# Patient Record
Sex: MALE | Race: BLACK OR AFRICAN AMERICAN | Employment: FULL TIME | URBAN - METROPOLITAN AREA
[De-identification: names, ages, dates, MRNs, and addresses within clinical notes are randomized per-mention and may not be internally consistent; named-entity substitution may affect disease eponyms.]

---

## 2019-08-14 ENCOUNTER — OFFICE VISIT (OUTPATIENT)
Dept: FAMILY MEDICINE | Facility: CLINIC | Age: 24
End: 2019-08-14
Payer: OTHER GOVERNMENT

## 2019-08-14 VITALS
BODY MASS INDEX: 28.62 KG/M2 | DIASTOLIC BLOOD PRESSURE: 71 MMHG | HEIGHT: 74 IN | OXYGEN SATURATION: 98 % | SYSTOLIC BLOOD PRESSURE: 118 MMHG | WEIGHT: 223 LBS | TEMPERATURE: 97.1 F | HEART RATE: 79 BPM

## 2019-08-14 DIAGNOSIS — M25.561 RIGHT KNEE PAIN, UNSPECIFIED CHRONICITY: ICD-10-CM

## 2019-08-14 DIAGNOSIS — S83.511D RUPTURE OF ANTERIOR CRUCIATE LIGAMENT OF RIGHT KNEE, SUBSEQUENT ENCOUNTER: Primary | ICD-10-CM

## 2019-08-14 PROCEDURE — 99203 OFFICE O/P NEW LOW 30 MIN: CPT | Performed by: FAMILY MEDICINE

## 2019-08-14 SDOH — HEALTH STABILITY: MENTAL HEALTH: HOW OFTEN DO YOU HAVE A DRINK CONTAINING ALCOHOL?: NEVER

## 2019-08-14 ASSESSMENT — MIFFLIN-ST. JEOR: SCORE: 2061.27

## 2019-08-14 NOTE — PROGRESS NOTES
"Subjective     Paulo Roldan is a 24 year old male who presents to clinic today for the following health issues:    HPI   Joint Pain    Onset: April 4th     Description:   Location: right knee  Character: Sharp, Dull ache and Burning    Intensity: moderate    Progression of Symptoms: same    Accompanying Signs & Symptoms:  Other symptoms: weakness of leg  and swelling    History:   Previous similar pain: no       Precipitating factors:   Trauma or overuse: YES    Alleviating factors:  Improved by: heat, ice and support wrap    Therapies Tried and outcome: support wrap, heat, ice   Patient is an active  and while in [place] 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  He had an MRI done which indicate possibly ACL tear along with some collateral damage.  I do not have any report or imaging however that is verbal by patient.  Since then he is having some discomfort with excessive walking.  He would like to see orthopedic surgeon for further evaluation.            Reviewed and updated as needed this visit by Provider         Review of Systems   ROS COMP: Constitutional, HEENT, cardiovascular, pulmonary, gi and gu systems are negative, except as otherwise noted.      Objective    /71   Pulse 79   Temp 97.1  F (36.2  C) (Tympanic)   Ht 1.88 m (6' 2\")   Wt 101.2 kg (223 lb)   SpO2 98%   BMI 28.63 kg/m    Body mass index is 28.63 kg/m .  Physical Exam   GENERAL: healthy, alert and no distress  RESP: lungs clear to auscultation - no rales, rhonchi or wheezes  CV: regular rate and rhythm, normal S1 S2, no S3 or S4, no murmur, click or rub, no peripheral edema and peripheral pulses strong  Inspected.  Some infrapatellar tenderness noted.  There is no obvious swelling.  Flexion is limited due to pain.  Internal rotation and external rotation slight limited due to pain.          Assessment & Plan       ICD-10-CM    1. Rupture of anterior cruciate " "ligament of right knee, subsequent encounter S83.511D ORTHOPEDICS ADULT REFERRAL   2. Right knee pain, unspecified chronicity M25.561 ORTHOPEDICS ADULT REFERRAL     Patient has right knee injury and as per patient he has ACL ligament injury after incident in April of this year.  He is advised to see an orthopedic for further evaluation.  Referral provided.       reports that he has been smoking.  He has never used smokeless tobacco.        BMI:   Estimated body mass index is 28.63 kg/m  as calculated from the following:    Height as of this encounter: 1.88 m (6' 2\").    Weight as of this encounter: 101.2 kg (223 lb).     Ted De León MD  [facility] [place]      "

## 2019-08-19 ENCOUNTER — TRANSFERRED RECORDS (OUTPATIENT)
Dept: HEALTH INFORMATION MANAGEMENT | Facility: CLINIC | Age: 24
End: 2019-08-19

## 2019-09-05 ENCOUNTER — TRANSFERRED RECORDS (OUTPATIENT)
Dept: HEALTH INFORMATION MANAGEMENT | Facility: CLINIC | Age: 24
End: 2019-09-05

## 2020-02-20 ENCOUNTER — OFFICE VISIT (OUTPATIENT)
Dept: FAMILY MEDICINE | Facility: CLINIC | Age: 25
End: 2020-02-20
Payer: OTHER GOVERNMENT

## 2020-02-20 VITALS
WEIGHT: 230 LBS | OXYGEN SATURATION: 100 % | HEART RATE: 71 BPM | BODY MASS INDEX: 29.52 KG/M2 | SYSTOLIC BLOOD PRESSURE: 121 MMHG | HEIGHT: 74 IN | DIASTOLIC BLOOD PRESSURE: 80 MMHG | TEMPERATURE: 98.2 F

## 2020-02-20 DIAGNOSIS — Z72.0 TOBACCO USE: ICD-10-CM

## 2020-02-20 DIAGNOSIS — N52.9 ERECTILE DYSFUNCTION, UNSPECIFIED ERECTILE DYSFUNCTION TYPE: ICD-10-CM

## 2020-02-20 DIAGNOSIS — Z00.00 ROUTINE GENERAL MEDICAL EXAMINATION AT A HEALTH CARE FACILITY: Primary | ICD-10-CM

## 2020-02-20 DIAGNOSIS — R63.5 WEIGHT GAIN: ICD-10-CM

## 2020-02-20 DIAGNOSIS — E66.3 OVERWEIGHT (BMI 25.0-29.9): ICD-10-CM

## 2020-02-20 LAB
BASOPHILS # BLD AUTO: 0 10E9/L (ref 0–0.2)
BASOPHILS NFR BLD AUTO: 0.2 %
DIFFERENTIAL METHOD BLD: ABNORMAL
EOSINOPHIL # BLD AUTO: 0.1 10E9/L (ref 0–0.7)
EOSINOPHIL NFR BLD AUTO: 1.1 %
ERYTHROCYTE [DISTWIDTH] IN BLOOD BY AUTOMATED COUNT: 13.7 % (ref 10–15)
HCT VFR BLD AUTO: 39.2 % (ref 40–53)
HGB BLD-MCNC: 13.5 G/DL (ref 13.3–17.7)
LYMPHOCYTES # BLD AUTO: 2.6 10E9/L (ref 0.8–5.3)
LYMPHOCYTES NFR BLD AUTO: 27.6 %
MCH RBC QN AUTO: 26.4 PG (ref 26.5–33)
MCHC RBC AUTO-ENTMCNC: 34.4 G/DL (ref 31.5–36.5)
MCV RBC AUTO: 77 FL (ref 78–100)
MONOCYTES # BLD AUTO: 1 10E9/L (ref 0–1.3)
MONOCYTES NFR BLD AUTO: 10.8 %
NEUTROPHILS # BLD AUTO: 5.7 10E9/L (ref 1.6–8.3)
NEUTROPHILS NFR BLD AUTO: 60.3 %
PLATELET # BLD AUTO: 326 10E9/L (ref 150–450)
RBC # BLD AUTO: 5.12 10E12/L (ref 4.4–5.9)
WBC # BLD AUTO: 9.5 10E9/L (ref 4–11)

## 2020-02-20 PROCEDURE — 36415 COLL VENOUS BLD VENIPUNCTURE: CPT | Performed by: PHYSICIAN ASSISTANT

## 2020-02-20 PROCEDURE — 80050 GENERAL HEALTH PANEL: CPT | Performed by: PHYSICIAN ASSISTANT

## 2020-02-20 PROCEDURE — 99213 OFFICE O/P EST LOW 20 MIN: CPT | Mod: 25 | Performed by: PHYSICIAN ASSISTANT

## 2020-02-20 PROCEDURE — 99395 PREV VISIT EST AGE 18-39: CPT | Performed by: PHYSICIAN ASSISTANT

## 2020-02-20 ASSESSMENT — MIFFLIN-ST. JEOR: SCORE: 2088.02

## 2020-02-20 NOTE — LETTER
February 24, 2020      Paulo Roldan  [address]  [place] [address]        Dear ,    We are writing to inform you of your test results.      -Hematocrit is slightly low which indicates mild anemia. Please try and eat a diet rich in iron ( lean meats, whole grains, green vegetables to combat this.  We can recheck this in about 2 months  -White blood cell and platelet counts are normal.  -Liver and gallbladder tests are normal (ALT,AST, Alk phos, bilirubin), kidney function is normal (Cr, GFR), sodium is normal, potassium is normal, calcium is normal, glucose is normal.  -TSH (thyroid stimulating hormone) level is normal which indicates normal thyroid function.      Resulted Orders   Comprehensive metabolic panel (BMP + Alb, Alk Phos, ALT, AST, Total. Bili, TP)   Result Value Ref Range    Sodium 139 133 - 144 mmol/L    Potassium 4.2 3.4 - 5.3 mmol/L    Chloride 107 94 - 109 mmol/L    Carbon Dioxide 29 20 - 32 mmol/L    Anion Gap 3 3 - 14 mmol/L    Glucose 82 70 - 99 mg/dL      Comment:      Non Fasting    Urea Nitrogen 12 7 - 30 mg/dL    Creatinine 1.06 0.66 - 1.25 mg/dL    GFR Estimate >90 >60 mL/min/[1.73_m2]      Comment:      Non  GFR Calc  Starting 12/18/2018, serum creatinine based estimated GFR (eGFR) will be   calculated using the Chronic Kidney Disease Epidemiology Collaboration   (CKD-EPI) equation.      GFR Estimate If Black >90 >60 mL/min/[1.73_m2]      Comment:       GFR Calc  Starting 12/18/2018, serum creatinine based estimated GFR (eGFR) will be   calculated using the Chronic Kidney Disease Epidemiology Collaboration   (CKD-EPI) equation.      Calcium 8.7 8.5 - 10.1 mg/dL    Bilirubin Total 0.6 0.2 - 1.3 mg/dL    Albumin 4.3 3.4 - 5.0 g/dL    Protein Total 7.5 6.8 - 8.8 g/dL    Alkaline Phosphatase 60 40 - 150 U/L    ALT 36 0 - 70 U/L    AST 22 0 - 45 U/L   TSH with free T4 reflex   Result Value Ref Range    TSH 1.14 0.40 - 4.00 mU/L   CBC with  platelets differential   Result Value Ref Range    WBC 9.5 4.0 - 11.0 10e9/L    RBC Count 5.12 4.4 - 5.9 10e12/L    Hemoglobin 13.5 13.3 - 17.7 g/dL    Hematocrit 39.2 (L) 40.0 - 53.0 %    MCV 77 (L) 78 - 100 fl    MCH 26.4 (L) 26.5 - 33.0 pg    MCHC 34.4 31.5 - 36.5 g/dL    RDW 13.7 10.0 - 15.0 %    Platelet Count 326 150 - 450 10e9/L    % Neutrophils 60.3 %    % Lymphocytes 27.6 %    % Monocytes 10.8 %    % Eosinophils 1.1 %    % Basophils 0.2 %    Absolute Neutrophil 5.7 1.6 - 8.3 10e9/L    Absolute Lymphocytes 2.6 0.8 - 5.3 10e9/L    Absolute Monocytes 1.0 0.0 - 1.3 10e9/L    Absolute Eosinophils 0.1 0.0 - 0.7 10e9/L    Absolute Basophils 0.0 0.0 - 0.2 10e9/L    Diff Method Automated Method        If you have any questions or concerns, please call the clinic at the number listed above.       Sincerely,        Reuben Lawson PA-C

## 2020-02-20 NOTE — PROGRESS NOTES
3  SUBJECTIVE:   CC: Paulo Roldan is an 25 year old male who presents for preventive health visit.     Healthy Habits:    Do you get at least three servings of calcium containing foods daily (dairy, green leafy vegetables, etc.)? yes    Amount of exercise or daily activities, outside of work: daily     Problems taking medications regularly No    Medication side effects: No    Have you had an eye exam in the past two years? yes    Do you see a dentist twice per year? yes    Do you have sleep apnea, excessive snoring or daytime drowsiness?no    Paulo is a new patient to our clinic today  He is currently In the [employer] and is stationed At [facility] in [place] for the next few years.  Overall  He is healthy. He recently had an ACL repair in September 2019 and at his preoperative examination he was told that he had a slightly enlarged thyroid.  He has been having some trouble losing weight despite frequent exercise and dieting.  Has also been experiencing  Erectile dysfunction. He is wondering if this could have to do with his thyroid. He denies use of ETOH, drug use or use of weight loss supplements/diet supplements with the exception of protein powder.         Today's PHQ-2 Score:   PHQ-2 ( 1999 Pf[employer]) 2/20/2020 8/14/2019   Q1: Little interest or pleasure in doing things 0 0   Q2: Feeling down, depressed or hopeless 0 0   PHQ-2 Score 0 0       Abuse: Current or Past(Physical, Sexual or Emotional)- No  Do you feel safe in your environment? Yes        Social History     Tobacco Use     Smoking status: Light Tobacco Smoker     Smokeless tobacco: Never Used   Substance Use Topics     Alcohol use: Never     Frequency: Never     If you drink alcohol do you typically have >3 drinks per day or >7 drinks per week? No                      Last PSA: No results found for: PSA    Reviewed orders with patient. Reviewed health maintenance and updated orders accordingly - Yes  Patient Active Problem List   Diagnosis      "Tobacco use     Erectile dysfunction, unspecified erectile dysfunction type     Weight gain     Overweight (BMI 25.0-29.9)     Past Surgical History:   Procedure Laterality Date     AS REPAIR CRUCIATE LIGAMENT,KNEE         Social History     Tobacco Use     Smoking status: Light Tobacco Smoker     Smokeless tobacco: Never Used   Substance Use Topics     Alcohol use: Never     Frequency: Never     Family History   Problem Relation Age of Onset     Family History Negative Mother          No current outpatient medications on file.     No Known Allergies  No lab results found.     Reviewed and updated as needed this visit by clinical staff  Tobacco  Allergies  Meds  Med Hx  Surg Hx  Soc Hx        Reviewed and updated as needed this visit by Provider  Tobacco  Meds  Med Hx  Surg Hx  Soc Hx       Past Medical History:   Diagnosis Date     ACL tear       Past Surgical History:   Procedure Laterality Date     AS REPAIR CRUCIATE LIGAMENT,KNEE         ROS:  CONSTITUTIONAL: NEGATIVE for fever, chills, change in weight  INTEGUMENTARY/SKIN: NEGATIVE for worrisome rashes, moles or lesions  EYES: NEGATIVE for vision changes or irritation  ENT: NEGATIVE for ear, mouth and throat problems  RESP: NEGATIVE for significant cough or SOB  CV: NEGATIVE for chest pain, palpitations or peripheral edema  GI: NEGATIVE for nausea, abdominal pain, heartburn, or change in bowel habits   male: negative for dysuria, hematuria, decreased urinary stream, positive for ED  MUSCULOSKELETAL: NEGATIVE for significant arthralgias or myalgia  NEURO: NEGATIVE for weakness, dizziness or paresthesias  PSYCHIATRIC: NEGATIVE for changes in mood or affect    OBJECTIVE:   /80   Pulse 71   Temp 98.2  F (36.8  C) (Oral)   Ht 1.88 m (6' 2\")   Wt 104.3 kg (230 lb)   SpO2 100%   BMI 29.53 kg/m    EXAM:  GENERAL: healthy, alert and no distress  EYES: Eyes grossly normal to inspection, PERRL and conjunctivae and sclerae normal  HENT: ear canals " "and TM's normal, nose and mouth without ulcers or lesions  NECK: no adenopathy, no asymmetry, masses, or scars and thyroid normal to palpation  RESP: lungs clear to auscultation - no rales, rhonchi or wheezes  CV: regular rate and rhythm, normal S1 S2, no S3 or S4, no murmur, click or rub, no peripheral edema and peripheral pulses strong  ABDOMEN: soft, nontender, no hepatosplenomegaly, no masses and bowel sounds normal  MS: no gross musculoskeletal defects noted, no edema  SKIN: no suspicious lesions or rashes  NEURO: Normal strength and tone, mentation intact and speech normal  PSYCH: mentation appears normal, affect normal/bright        ASSESSMENT/PLAN:   1. Routine general medical examination at a health care facility  [facility], he is not fasting today and so will wait on lipids. Will check labs today to assess for underlying cause of his symptoms. His thyroid is normal on my exam today, but we will check TSH/T4 to ensure normal function.   Offered medications for ED if labs normal.  He will think about this.       2. Weight gain  - Comprehensive metabolic panel (BMP + Alb, Alk Phos, ALT, AST, Total. Bili, TP)  - TSH with free T4 reflex  - CBC with platelets differential    3. Erectile dysfunction, unspecified erectile dysfunction type    4. Tobacco use    5. Overweight (BMI 25.0-29.9)        COUNSELING:  Reviewed preventive health counseling, as reflected in patient instructions       Regular exercise       Healthy diet/nutrition    Estimated body mass index is 29.53 kg/m  as calculated from the following:    Height as of this encounter: 1.88 m (6' 2\").    Weight as of this encounter: 104.3 kg (230 lb).    Weight management plan: Discussed healthy diet and exercise guidelines     reports that he has been smoking. He has never used smokeless tobacco.  Tobacco Cessation Action Plan: Information offered: Patient not interested at this time    Counseling Resources:  ATP IV Guidelines  Pooled Cohorts Equation " Calculator  FRAX Risk Assessment  ICSI Preventive Guidelines  Dietary Guidelines for Americans, 2010  USDA's MyPlate  ASA Prophylaxis  Lung CA Screening    Reuben Lawson PA-C  [facility] [place]

## 2020-02-21 LAB
ALBUMIN SERPL-MCNC: 4.3 G/DL (ref 3.4–5)
ALP SERPL-CCNC: 60 U/L (ref 40–150)
ALT SERPL W P-5'-P-CCNC: 36 U/L (ref 0–70)
ANION GAP SERPL CALCULATED.3IONS-SCNC: 3 MMOL/L (ref 3–14)
AST SERPL W P-5'-P-CCNC: 22 U/L (ref 0–45)
BILIRUB SERPL-MCNC: 0.6 MG/DL (ref 0.2–1.3)
BUN SERPL-MCNC: 12 MG/DL (ref 7–30)
CALCIUM SERPL-MCNC: 8.7 MG/DL (ref 8.5–10.1)
CHLORIDE SERPL-SCNC: 107 MMOL/L (ref 94–109)
CO2 SERPL-SCNC: 29 MMOL/L (ref 20–32)
CREAT SERPL-MCNC: 1.06 MG/DL (ref 0.66–1.25)
GFR SERPL CREATININE-BSD FRML MDRD: >90 ML/MIN/{1.73_M2}
GLUCOSE SERPL-MCNC: 82 MG/DL (ref 70–99)
POTASSIUM SERPL-SCNC: 4.2 MMOL/L (ref 3.4–5.3)
PROT SERPL-MCNC: 7.5 G/DL (ref 6.8–8.8)
SODIUM SERPL-SCNC: 139 MMOL/L (ref 133–144)
TSH SERPL DL<=0.005 MIU/L-ACNC: 1.14 MU/L (ref 0.4–4)

## 2020-08-24 ENCOUNTER — TELEPHONE (OUTPATIENT)
Dept: FAMILY MEDICINE | Facility: CLINIC | Age: 25
End: 2020-08-24

## 2020-08-24 DIAGNOSIS — S83.519S RUPTURE OF ANTERIOR CRUCIATE LIGAMENT OF KNEE, UNSPECIFIED LATERALITY, SEQUELA: Primary | ICD-10-CM

## 2020-08-24 NOTE — TELEPHONE ENCOUNTER
S/w pt who states he tore his ACL last year and had surgery and has been doing PT.  The referral is to clear pt from PT.  He has been seeing Prakash Albarado through [facility].    Yasmeen CASTANO RN  EP Triage

## 2020-08-24 NOTE — TELEPHONE ENCOUNTER
Patient calling requesting referral for sport and ortho with Dr. Mai at [facility] phone #[phone] sent over for him to schedule an appt. Appt for physical therapy.  Please advise when referral is sent.  [phone] (home)   Thank you  Cydney Lee

## 2020-09-04 ENCOUNTER — ANCILLARY PROCEDURE (OUTPATIENT)
Dept: GENERAL RADIOLOGY | Facility: CLINIC | Age: 25
End: 2020-09-04
Attending: FAMILY MEDICINE
Payer: OTHER GOVERNMENT

## 2020-09-04 ENCOUNTER — NURSE TRIAGE (OUTPATIENT)
Dept: FAMILY MEDICINE | Facility: CLINIC | Age: 25
End: 2020-09-04

## 2020-09-04 ENCOUNTER — OFFICE VISIT (OUTPATIENT)
Dept: URGENT CARE | Facility: URGENT CARE | Age: 25
End: 2020-09-04
Payer: OTHER GOVERNMENT

## 2020-09-04 VITALS
BODY MASS INDEX: 29.39 KG/M2 | DIASTOLIC BLOOD PRESSURE: 78 MMHG | WEIGHT: 229 LBS | OXYGEN SATURATION: 100 % | SYSTOLIC BLOOD PRESSURE: 125 MMHG | HEART RATE: 69 BPM | TEMPERATURE: 97.5 F | HEIGHT: 74 IN

## 2020-09-04 DIAGNOSIS — R07.89 CHEST DISCOMFORT: Primary | ICD-10-CM

## 2020-09-04 LAB
ANION GAP SERPL CALCULATED.3IONS-SCNC: 5 MMOL/L (ref 3–14)
BASOPHILS # BLD AUTO: 0 10E9/L (ref 0–0.2)
BASOPHILS NFR BLD AUTO: 0.4 %
BUN SERPL-MCNC: 14 MG/DL (ref 7–30)
CALCIUM SERPL-MCNC: 8.7 MG/DL (ref 8.5–10.1)
CHLORIDE SERPL-SCNC: 107 MMOL/L (ref 94–109)
CO2 SERPL-SCNC: 28 MMOL/L (ref 20–32)
CREAT SERPL-MCNC: 1.28 MG/DL (ref 0.66–1.25)
DIFFERENTIAL METHOD BLD: ABNORMAL
EOSINOPHIL # BLD AUTO: 0.1 10E9/L (ref 0–0.7)
EOSINOPHIL NFR BLD AUTO: 2 %
ERYTHROCYTE [DISTWIDTH] IN BLOOD BY AUTOMATED COUNT: 14.2 % (ref 10–15)
GFR SERPL CREATININE-BSD FRML MDRD: 76 ML/MIN/{1.73_M2}
GLUCOSE SERPL-MCNC: 102 MG/DL (ref 70–99)
HCT VFR BLD AUTO: 38.1 % (ref 40–53)
HGB BLD-MCNC: 12.7 G/DL (ref 13.3–17.7)
LYMPHOCYTES # BLD AUTO: 2.5 10E9/L (ref 0.8–5.3)
LYMPHOCYTES NFR BLD AUTO: 36.5 %
MCH RBC QN AUTO: 26.3 PG (ref 26.5–33)
MCHC RBC AUTO-ENTMCNC: 33.3 G/DL (ref 31.5–36.5)
MCV RBC AUTO: 79 FL (ref 78–100)
MONOCYTES # BLD AUTO: 0.8 10E9/L (ref 0–1.3)
MONOCYTES NFR BLD AUTO: 11.5 %
NEUTROPHILS # BLD AUTO: 3.4 10E9/L (ref 1.6–8.3)
NEUTROPHILS NFR BLD AUTO: 49.6 %
PLATELET # BLD AUTO: 291 10E9/L (ref 150–450)
POTASSIUM SERPL-SCNC: 4.4 MMOL/L (ref 3.4–5.3)
RBC # BLD AUTO: 4.83 10E12/L (ref 4.4–5.9)
SODIUM SERPL-SCNC: 140 MMOL/L (ref 133–144)
WBC # BLD AUTO: 6.9 10E9/L (ref 4–11)

## 2020-09-04 PROCEDURE — 71046 X-RAY EXAM CHEST 2 VIEWS: CPT

## 2020-09-04 PROCEDURE — 80048 BASIC METABOLIC PNL TOTAL CA: CPT | Performed by: FAMILY MEDICINE

## 2020-09-04 PROCEDURE — 93000 ELECTROCARDIOGRAM COMPLETE: CPT | Performed by: FAMILY MEDICINE

## 2020-09-04 PROCEDURE — 99214 OFFICE O/P EST MOD 30 MIN: CPT | Performed by: FAMILY MEDICINE

## 2020-09-04 PROCEDURE — 36415 COLL VENOUS BLD VENIPUNCTURE: CPT | Performed by: FAMILY MEDICINE

## 2020-09-04 PROCEDURE — 85025 COMPLETE CBC W/AUTO DIFF WBC: CPT | Performed by: FAMILY MEDICINE

## 2020-09-04 ASSESSMENT — MIFFLIN-ST. JEOR: SCORE: 2083.49

## 2020-09-04 NOTE — TELEPHONE ENCOUNTER
Patient calling to report intermittent, non radiating chest pain the last week. No other associated symptoms. Change of position causes sharp pain.   Sometimes brought on by deep inspiration. Denies pain in legs. States that he did travel to [place] 8/26-8/27.  SEE TODAY IN OFFICE:   * You need to be examined today. Let me give you an appointment.  * IF NO AVAILABLE APPOINTMENTS:  You need to be seen in the Urgent Care Center. Go to the one at [facility]. Go there today. A nearby Urgent Care Center is often a good source of care. Another choice is to go to the ER.      Additional Information    Negative: Severe difficulty breathing (e.g., struggling for each breath, speaks in single words)    Negative: Passed out (i.e., fainted, collapsed and was not responding)    Negative: Chest pain lasting longer than 5 minutes and ANY of the following:* Over 50 years old* Over 30 years old and at least one cardiac risk factor (i.e., high blood pressure, diabetes, high cholesterol, obesity, smoker or strong family history of heart disease)* Pain is crushing, pressure-like, or heavy * Took nitroglycerin and chest pain was not relieved* History of heart disease (i.e., angina, heart attack, bypass surgery, angioplasty, CHF)    Negative: Visible sweat on face or sweat dripping down face    Negative: Sounds like a life-threatening emergency to the triager    Negative: Followed an injury to chest    Negative: SEVERE chest pain    Negative: Pain also present in shoulder(s) or arm(s) or jaw    Negative: Difficulty breathing    Negative: Cocaine use within last 3 days    Negative: History of prior 'blood clot' in leg or lungs (i.e., deep vein thrombosis, pulmonary embolism)    Negative: Recent illness requiring prolonged bed rest (i.e., immobilization)    Negative: Hip or leg fracture in past 2 months (e.g, or had cast on leg or ankle)    Negative: Major surgery in the past month    Negative: Recent long-distance travel with prolonged  "time in car, bus, plane, or train (i.e., within past 2 weeks; 6 or more hours duration)    Negative: Heart beating irregularly or very rapidly    Negative: Chest pain lasting longer than 5 minutes    Negative: Intermittent chest pain and pain has been increasing in severity or frequency    Negative: Dizziness or lightheadedness    Negative: Coughing up blood    Negative: Patient sounds very sick or weak to the triager    Intermittent chest pains persist > 3 days    Answer Assessment - Initial Assessment Questions  1. LOCATION: \"Where does it hurt?\"        Left side between center and to the left. OK to take deep breaths.   2. RADIATION: \"Does the pain go anywhere else?\" (e.g., into neck, jaw, arms, back)      none  3. ONSET: \"When did the chest pain begin?\" (Minutes, hours or days)       Started 1 week ago. Running the past few months. Random last week Wednesday just started hurting.   4. PATTERN \"Does the pain come and go, or has it been constant since it started?\"  \"Does it get worse with exertion?\"       Comes and goes. If lean to the left feel more. Sometimes with deep breath. Lay on right hand side. Has to readjust.   5. DURATION: \"How long does it last\" (e.g., seconds, minutes, hours)      Sharp shooting pain that lasts for a few minutes. Occurs 5-10 times a day.  6. SEVERITY: \"How bad is the pain?\"  (e.g., Scale 1-10; mild, moderate, or severe)     - MILD (1-3): doesn't interfere with normal activities      - MODERATE (4-7): interferes with normal activities or awakens from sleep     - SEVERE (8-10): excruciating pain, unable to do any normal activities       Not hindering, but persistent mild pain.   7. CARDIAC RISK FACTORS: \"Do you have any history of heart problems or risk factors for heart disease?\" (e.g., prior heart attack, angina; high blood pressure, diabetes, being overweight, high cholesterol, smoking, or strong family history of heart disease)      Smokes occasionally - vape  8. PULMONARY RISK " "FACTORS: \"Do you have any history of lung disease?\"  (e.g., blood clots in lung, asthma, emphysema, birth control pills)      none  9. CAUSE: \"What do you think is causing the chest pain?\"      No idea  10. OTHER SYMPTOMS: \"Do you have any other symptoms?\" (e.g., dizziness, nausea, vomiting, sweating, fever, difficulty breathing, cough)        none  11. PREGNANCY: \"Is there any chance you are pregnant?\" \"When was your last menstrual period?\"        NA    Protocols used: CHEST PAIN-A-[place]  Meliza Whiting RN    "

## 2020-09-04 NOTE — PROGRESS NOTES
"SUBJECTIVE:   Paulo Roldan is a 25 year old male presenting with a chief complaint of   Chief Complaint   Patient presents with     Urgent Care     chest pain for a few days.   Off-and-on chest pain over the last several days 10-15 times a day will last couple of minutes and then go away.  He does not have associated shortness of breath.  When he leans forward he says he has more of this discomfort.  It is merely discomfort he does not think that is musculoskeletal in nature.    There is no family history    His history is otherwise been pretty benign other than at a young age he has had some erectile dysfunction.    He has gone on 2 mile runs without significant problem with having to stop were not able to perform this run because of the chest pain or shortness of breath,     As a matter fact during his run the pain will go away with more time that he is running,    No shortness of breath it wakes him up at night.  No leg swelling    He is an established patient of [facility].        Review of Systems   Cardiovascular: Positive for chest pain.   All other systems reviewed and are negative.      Past Medical History:   Diagnosis Date     ACL tear      Family History   Problem Relation Age of Onset     Family History Negative Mother      No current outpatient medications on file.     Social History     Tobacco Use     Smoking status: Light Tobacco Smoker     Smokeless tobacco: Never Used   Substance Use Topics     Alcohol use: Never     Frequency: Never       OBJECTIVE  /78   Pulse 69   Temp 97.5  F (36.4  C) (Temporal)   Ht 1.88 m (6' 2\")   Wt 103.9 kg (229 lb)   SpO2 100%   BMI 29.40 kg/m      Physical Exam  Vitals signs and nursing note reviewed.   HENT:      Head: Normocephalic.   Eyes:      Pupils: Pupils are equal, round, and reactive to light.   Neck:      Musculoskeletal: Normal range of motion.   Cardiovascular:      Rate and Rhythm: Normal rate and regular rhythm.      Comments: There is slight " tenderness to palpation along the chest wall but he thinks that this is secondary to his lifting weights  Pulmonary:      Effort: Pulmonary effort is normal.      Breath sounds: Normal breath sounds.   Abdominal:      General: Abdomen is flat.      Palpations: Abdomen is soft.   Musculoskeletal: Normal range of motion.   Skin:     General: Skin is warm.   Neurological:      General: No focal deficit present.      Mental Status: He is alert and oriented to person, place, and time.   Psychiatric:         Mood and Affect: Mood normal.         Labs:  Results for orders placed or performed in visit on 09/04/20   XR Chest 2 Views     Status: None    Narrative    CHEST TWO VIEWS 9/4/2020 4:05 PM     HISTORY: Chest discomfort    COMPARISON: None.       Impression    IMPRESSION: There are no acute infiltrates. The cardiac silhouette is  not enlarged. Pulmonary vasculature is unremarkable.    MAIDA VERA MD   CBC with platelets and differential     Status: Abnormal   Result Value Ref Range    WBC 6.9 4.0 - 11.0 10e9/L    RBC Count 4.83 4.4 - 5.9 10e12/L    Hemoglobin 12.7 (L) 13.3 - 17.7 g/dL    Hematocrit 38.1 (L) 40.0 - 53.0 %    MCV 79 78 - 100 fl    MCH 26.3 (L) 26.5 - 33.0 pg    MCHC 33.3 31.5 - 36.5 g/dL    RDW 14.2 10.0 - 15.0 %    Platelet Count 291 150 - 450 10e9/L    % Neutrophils 49.6 %    % Lymphocytes 36.5 %    % Monocytes 11.5 %    % Eosinophils 2.0 %    % Basophils 0.4 %    Absolute Neutrophil 3.4 1.6 - 8.3 10e9/L    Absolute Lymphocytes 2.5 0.8 - 5.3 10e9/L    Absolute Monocytes 0.8 0.0 - 1.3 10e9/L    Absolute Eosinophils 0.1 0.0 - 0.7 10e9/L    Absolute Basophils 0.0 0.0 - 0.2 10e9/L    Diff Method Automated Method    Basic metabolic panel  (Ca, Cl, CO2, Creat, Gluc, K, Na, BUN)     Status: Abnormal   Result Value Ref Range    Sodium 140 133 - 144 mmol/L    Potassium 4.4 3.4 - 5.3 mmol/L    Chloride 107 94 - 109 mmol/L    Carbon Dioxide 28 20 - 32 mmol/L    Anion Gap 5 3 - 14 mmol/L    Glucose 102 (H) 70 -  99 mg/dL    Urea Nitrogen 14 7 - 30 mg/dL    Creatinine 1.28 (H) 0.66 - 1.25 mg/dL    GFR Estimate 76 >60 mL/min/[1.73_m2]    GFR Estimate If Black 88 >60 mL/min/[1.73_m2]    Calcium 8.7 8.5 - 10.1 mg/dL       X-Ray : clear in both lung fields. There is no effusion and heart size is normal.    ASSESSMENT:      ICD-10-CM    1. Chest discomfort  R07.89 XR Chest 2 Views     CBC with platelets and differential     Basic metabolic panel  (Ca, Cl, CO2, Creat, Gluc, K, Na, BUN)      this appears to be a normal exam of a young male with no significant PMH. No risk factors other than he is male.    Regular exercise.     This is not likely cardiac and most probably musculoskeletal, could be viral uri. Doubt PE    Medical Decision Making:    Differential Diagnosis:  MI, pleurisy, Musculoskeletal pain , bronchitis    Serious Comorbid Conditions:  Adult:  None    PLAN:  1. Observation and follow with PCP in 1 month    Followup:    If not improving or if condition worsens, follow up with your Primary Care Provider.    There are no Patient Instructions on file for this visit.

## 2020-09-17 ENCOUNTER — OFFICE VISIT (OUTPATIENT)
Dept: FAMILY MEDICINE | Facility: CLINIC | Age: 25
End: 2020-09-17
Payer: OTHER GOVERNMENT

## 2020-09-17 VITALS
SYSTOLIC BLOOD PRESSURE: 112 MMHG | WEIGHT: 229 LBS | HEART RATE: 67 BPM | OXYGEN SATURATION: 98 % | DIASTOLIC BLOOD PRESSURE: 62 MMHG | TEMPERATURE: 97.9 F | BODY MASS INDEX: 29.4 KG/M2

## 2020-09-17 DIAGNOSIS — R07.89 CHEST WALL PAIN: Primary | ICD-10-CM

## 2020-09-17 PROCEDURE — 99213 OFFICE O/P EST LOW 20 MIN: CPT | Performed by: PHYSICIAN ASSISTANT

## 2020-09-17 RX ORDER — MULTIPLE VITAMINS W/ MINERALS TAB 9MG-400MCG
1 TAB ORAL DAILY
COMMUNITY

## 2020-09-17 NOTE — PROGRESS NOTES
Subjective     Paulo Roldan is a 25 year old male who presents to clinic today for the following health issues:    HPI       ED/UC Followup:    Facility:  [facility] urgent care   Date of visit: 9/4/20  Reason for visit: chest pain   Current Status: still having pain when moving in different positions        Yuli was seen  In UC 2 weeks ago for chest pain located to the left of his sternum that is worse with slouching and with different positioning.  A chest x ry and EKG were completed in UC and were unremarkable.  This was thought to be MSK related.  He has been taking ibuprofen 400 mg every other day with little relief.  He is not having any associated exertional symptoms, SOB, or dizziness.         Review of Systems   Constitutional, HEENT, cardiovascular, pulmonary, GI, , musculoskeletal, neuro, skin, endocrine and psych systems are negative, except as otherwise noted.      Objective    /62   Pulse 67   Temp 97.9  F (36.6  C) (Tympanic)   Wt 103.9 kg (229 lb)   SpO2 98%   BMI 29.40 kg/m    Body mass index is 29.4 kg/m .  Physical Exam   GENERAL: healthy, alert and no distress  RESP: lungs clear to auscultation - no rales, rhonchi or wheezes, TTP over sternum and along left sided anterior chest wall  CV: regular rate and rhythm, normal S1 S2, no S3 or S4, no murmur  PSYCH: mentation appears normal, affect normal/bright            1. Chest wall pain  Reproducible chest wall pain noted along sternum and along left sided anterior chest.  Advise that he take ibuprofen 800 mg every 8 hours with food, gentle stretches and warm compresses.  He will return to the clinic if symptoms persist

## 2021-05-11 ENCOUNTER — ANCILLARY PROCEDURE (OUTPATIENT)
Dept: GENERAL RADIOLOGY | Facility: CLINIC | Age: 26
End: 2021-05-11
Attending: PHYSICIAN ASSISTANT
Payer: OTHER GOVERNMENT

## 2021-05-11 ENCOUNTER — OFFICE VISIT (OUTPATIENT)
Dept: FAMILY MEDICINE | Facility: CLINIC | Age: 26
End: 2021-05-11
Payer: OTHER GOVERNMENT

## 2021-05-11 VITALS
TEMPERATURE: 97.8 F | HEART RATE: 71 BPM | DIASTOLIC BLOOD PRESSURE: 82 MMHG | BODY MASS INDEX: 29.22 KG/M2 | OXYGEN SATURATION: 97 % | SYSTOLIC BLOOD PRESSURE: 134 MMHG | WEIGHT: 227.6 LBS

## 2021-05-11 DIAGNOSIS — M25.562 ACUTE PAIN OF LEFT KNEE: ICD-10-CM

## 2021-05-11 DIAGNOSIS — M76.52 PATELLAR TENDINITIS OF LEFT KNEE: ICD-10-CM

## 2021-05-11 DIAGNOSIS — M25.562 ACUTE PAIN OF LEFT KNEE: Primary | ICD-10-CM

## 2021-05-11 PROBLEM — Z11.4 SCREENING FOR HIV (HUMAN IMMUNODEFICIENCY VIRUS): Status: ACTIVE | Noted: 2021-05-11

## 2021-05-11 PROCEDURE — 99213 OFFICE O/P EST LOW 20 MIN: CPT | Performed by: PHYSICIAN ASSISTANT

## 2021-05-11 PROCEDURE — 73562 X-RAY EXAM OF KNEE 3: CPT | Mod: LT | Performed by: RADIOLOGY

## 2021-05-11 ASSESSMENT — PAIN SCALES - GENERAL: PAINLEVEL: MODERATE PAIN (4)

## 2021-05-11 NOTE — PROGRESS NOTES
"    Assessment & Plan     Patellar tendinitis of left knee  X ray is negative per my read.  Advised that he begin a course of scheduled NSAIDs for likely patellar tendonitis.He will continue to rest, ice and wear a knee sleeve.  If no improvement with conservative measures in 3-4 weeks, he will follow up at that time.     Acute pain of left knee  - XR Knee Left 3 Views; Future      Tobacco Cessation:   reports that he has been smoking. He has never used smokeless tobacco.      BMI:   Estimated body mass index is 29.22 kg/m  as calculated from the following:    Height as of 9/4/20: 1.88 m (6' 2\").    Weight as of this encounter: 103.2 kg (227 lb 9.6 oz).       Return in about 3 weeks (around 6/1/2021) for follow up if knee pain persists.    SHARRI Guillen [facility] DARRYL Bates is a 26 year old who presents for the following health issues     HPI     Musculoskeletal problem/pain  Onset/Duration: 2 weeks  Description  Location: knee - left  Joint Swelling: YES- when it first happened, then went away  Redness: no  Pain: YES  Warmth: no  Intensity:  moderate  Progression of Symptoms:  same  Accompanying signs and symptoms:   Fevers: no  Numbness/tingling/weakness: no  History  Trauma to the area: YES  Recent illness:  no  Previous similar problem: no  Previous evaluation:  no  Precipitating or alleviating factors:  Aggravating factors include: running, walking a lot, fast walking  Therapies tried and outcome: ice and Ibuprofen    Paulo has been experiencing left sided knee pain just underneath his patella x 2 weeks.  He reports that he I a runner and exercises frequently.  While running, he noticed a sudden pain of his knee.  The following day, his knee became swollen and painful. The swelling has resolved but he continues to have pain with walking        Review of Systems   Constitutional, HEENT, cardiovascular, pulmonary, GI, , musculoskeletal, neuro, skin, endocrine " and psych systems are negative, except as otherwise noted.      Objective    /82 (Cuff Size: Adult Large)   Pulse 71   Temp 97.8  F (36.6  C) (Tympanic)   Wt 103.2 kg (227 lb 9.6 oz)   SpO2 97%   BMI 29.22 kg/m    Body mass index is 29.22 kg/m .  Physical Exam   GENERAL: healthy, alert and no distress  MS: no left knee effusion noted, no specific TTP of left knee, FROM of left knee with pain on active knee extension, 5/5 strength of LE bilaterally  PSYCH: mentation appears normal, affect normal/bright

## 2021-05-11 NOTE — LETTER
[facility]          [address] [place] [address]                            [phone]  Fax: [phone]    Paulo Roldan  [id number]  [facility] [id number]    [phone]    May 11, 2021      To whom it may concern   Paulo Roldan was seen in my office on 5/11/2021 for a knee injury.  He should avoid running and high impact knee exercises at work over the next three weeks ( until June 1, 2021).   If you have any other questions or concerns please feel free to contact me at anytime.        Sincerely,    Reuben Lawson PA-C

## 2021-06-08 ENCOUNTER — TELEPHONE (OUTPATIENT)
Dept: FAMILY MEDICINE | Facility: CLINIC | Age: 26
End: 2021-06-08

## 2021-06-08 DIAGNOSIS — M25.562 LEFT KNEE PAIN, UNSPECIFIED CHRONICITY: Primary | ICD-10-CM

## 2021-06-08 NOTE — TELEPHONE ENCOUNTER
Pt calling and was seen on 5/11 for knee pain. He is still having a lot of knee pain and would like a referral to an an orthopedic - specifically Dr Prakash Albarado. Please place referral of appropriate. Any questions, pt can be reached at [phone]. Also, TC please notify pt nce referral has been placed.Thank you.  Silvia Bearden,

## 2021-06-08 NOTE — TELEPHONE ENCOUNTER
I will place this referral for him.  Looks like Dr. Albarado is at [facility].  I will place an external referral.  Also forwarding to Pina Tomas. Please fax ( I cannot find fax #)

## 2021-06-08 NOTE — TELEPHONE ENCOUNTER
Referral faxed to Dr Albarado at fax # [phone]. Message left to notify the pt.  Silvia Bearden,

## 2022-07-26 ENCOUNTER — NURSE TRIAGE (OUTPATIENT)
Dept: FAMILY MEDICINE | Facility: CLINIC | Age: 27
End: 2022-07-26

## 2022-07-26 NOTE — TELEPHONE ENCOUNTER
Reason for Call:  Other appointment    Detailed comments: Patient called and is having back pain and chest pain with dizziness for two days he declined triage when I tried to transfer him he would like to see his primary please advise thank you    Phone Number Patient can be reached at: Home number on file [phone] (home)    Best Time: anyitme    Can we leave a detailed message on this number? YES    Call taken on 7/26/2022 at 8:46 AM by Rosalia Elliott

## 2022-07-26 NOTE — TELEPHONE ENCOUNTER
"Patient was called. Yesterday tried to go on a regular run when he got chest pain with SOB, with feelings of collapsing chest. He had to stop and had somebody pick him up.    1. LOCATION: \"Where does it hurt?\"        Pain started in the middle of the chest and then spread to the whole chest and back.   2. RADIATION: \"Does the pain go anywhere else?\" (e.g., into neck, jaw, arms, back)      Pain went into the back.   3. ONSET: \"When did the chest pain begin?\" (Minutes, hours or days)       Yesterday during the run was his first episode and SOB lasted 5 minutes.   4. PATTERN \"Does the pain come and go, or has it been constant since it started?\"  \"Does it get worse with exertion?\"       Pain didn't completely go away since yesterday, woke him up again last night with same pain.   5. DURATION: \"How long does it last\" (e.g., seconds, minutes, hours)      5 minutes.   6. SEVERITY: \"How bad is the pain?\"  (e.g., Scale 1-10; mild, moderate, or severe)     Yesterday 8 - 9/10 during the run, last night 8-9/10, Now pain is 1/10. Can feel it with deep breaths.   7. CARDIAC RISK FACTORS: \"Do you have any history of heart problems or risk factors for heart disease?\" (e.g., angina, prior heart attack; diabetes, high blood pressure, high cholesterol, smoker, or strong family history of heart disease)      No.   8. PULMONARY RISK FACTORS: \"Do you have any history of lung disease?\"  (e.g., blood clots in lung, asthma, emphysema, birth control pills)      No.   9. CAUSE: \"What do you think is causing the chest pain?\"      Does not know.   10. OTHER SYMPTOMS: \"Do you have any other symptoms?\" (e.g., dizziness, nausea, vomiting, sweating, fever, difficulty breathing, cough)        Was sweating last night. Feel warm today, but has not checked temp. Feels dizzy - not going away. Able to walk okay. Drinking a gallon of water a day. Ate breakfast.     Current situation: pressure in the middle of chest 1/10, no SOB, some dizziness.     No " openings at  clinic. Advised UC now. ER if pain, SOB, dizziness worsen and are progressively worse. Pt agreed with this plan.     Reason for Disposition    Dizziness or lightheadedness    Patient sounds very sick or weak to the triager    Chest pain lasting longer than 5 minutes and occurred in last 3 days (72 hours) (Exception: feels exactly the same as previously diagnosed heartburn and has accompanying sour taste in mouth)    Additional Information    Negative: Severe difficulty breathing (e.g., struggling for each breath, speaks in single words)    Negative: Passed out (i.e., fainted, collapsed and was not responding)    Negative: Difficult to awaken or acting confused (e.g., disoriented, slurred speech)    Negative: Shock suspected (e.g., cold/pale/clammy skin, too weak to stand, low BP, rapid pulse)    Negative: Chest pain lasting longer than 5 minutes and ANY of the following:* Over 45 years old* Over 30 years old and at least one cardiac risk factor (e.g., diabetes, high blood pressure, high cholesterol, smoker, or strong family history of heart disease)* History of heart disease (i.e., angina, heart attack, heart failure, bypass surgery, takes nitroglycerin)* Pain is crushing, pressure-like, or heavy    Negative: Heart beating < 50 beats per minute OR > 140 beats per minute    Negative: Visible sweat on face or sweat dripping down face    Negative: Sounds like a life-threatening emergency to the triager    Negative: Followed an injury to chest    Negative: Cocaine use within last 3 days    Negative: SEVERE chest pain    Negative: Pain also in shoulder(s) or arm(s) or jaw    Negative: Difficulty breathing    Negative: Major surgery in the past month    Negative: Hip or leg fracture (broken bone) in past month (or had cast on leg or ankle in past month)    Negative: Illness requiring prolonged bedrest in past month (e.g., immobilization, long hospital stay)    Negative: Long-distance travel in past month  (e.g., car, bus, train, plane; with trip lasting 6 or more hours)    Negative: History of prior 'blood clot' in leg or lungs (i.e., deep vein thrombosis, pulmonary embolism)    Negative: History of inherited increased risk of blood clots (e.g., Factor 5 Leiden, Anti-thrombin 3, Protein C or Protein S deficiency, Prothrombin mutation)    Negative: Heart beating irregularly or very rapidly    Negative: Coughing up blood    Negative: Chest pain or 'angina' comes and goes and is happening more often (increasing in frequency) or getting worse (increasing in severity) (Exception: chest pains that last only a few seconds)    Protocols used: CHEST PAIN-A-[place]

## 2022-07-26 NOTE — TELEPHONE ENCOUNTER
Routing to Triage RN's and Reuben Lawson. Red flag symptoms    Regla Palacio,  [facility]

## 2022-07-26 NOTE — TELEPHONE ENCOUNTER
I do not have any availability to double book today.  Please schedule today with another provider first available in same day spot